# Patient Record
Sex: MALE | Race: WHITE | Employment: FULL TIME | URBAN - METROPOLITAN AREA
[De-identification: names, ages, dates, MRNs, and addresses within clinical notes are randomized per-mention and may not be internally consistent; named-entity substitution may affect disease eponyms.]

---

## 2021-11-08 ENCOUNTER — HOSPITAL ENCOUNTER (OUTPATIENT)
Dept: PHYSICAL THERAPY | Age: 71
Setting detail: THERAPIES SERIES
Discharge: HOME | End: 2021-11-08
Payer: MEDICARE

## 2021-11-08 PROCEDURE — 97110 THERAPEUTIC EXERCISES: CPT

## 2021-11-08 PROCEDURE — 97161 PT EVAL LOW COMPLEX 20 MIN: CPT

## 2021-11-08 PROCEDURE — 97140 MANUAL THERAPY 1/> REGIONS: CPT

## 2021-11-08 PROCEDURE — 97112 NEUROMUSCULAR REEDUCATION: CPT

## 2021-11-08 NOTE — PLAN OF CARE
[place] [id number] [facility] [address], [address] [place], [place], [facility]  Phone: [phone], [phone]                                                    Physical Therapy Certification    Dear Referring Practitioner: Dona Kiser,    We had the pleasure of evaluating the following patient for physical therapy services at [address] [place] [address]. A summary of our findings can be found in the initial assessment below. This includes our plan of care. If you have any questions or concerns regarding these findings, please do not hesitate to contact me at the office phone number checked above. Thank you for the referral.       Physician Signature:_______________________________Date:__________________  By signing above (or electronic signature), therapists plan is approved by physician      Patient: Brittany Malik \"Alvin\"  : 1950   MRN: [id number]  Referring Physician: Referring Practitioner: Dona Kiser      Evaluation Date: 2021      Medical Diagnosis Information:  Diagnosis: Acute Pain of B Shoulders; Neck Pain   Treatment Diagnosis: M25.511; M25.512; M54.2                                         Insurance information: PT Insurance Information: Medicare    Precautions/ Contra-indications/Relevant Medical History: Hx Left Shoulder Pain     C-SSRS Triggered by Intake questionnaire (Past 2 wk assessment):   [x] No, Questionnaire did not trigger screening.   [] Yes, Patient intake triggered further evaluation      [] C-SSRS Screening completed  [] PCP notified via Plan of Care  [] Emergency services notified     Latex Allergy:  [x]NO      []YES     Preferred Language for Healthcare:   [x]English       []other:     SUBJECTIVE: Patient stated complaint: Patient is a 65 y/o male who presents with increased increased B shoulder pain ( Right worse then Left) and neck pain.   Patient reports having had progressive pain over the past few months and decided to have it check out. 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     Living Status/Prior Level of Function: Independent with ADLs and IADLs     OBJECTIVE:     CERV ROM     Cervical Flexion 45 °     Cervical Extension 25 °     Cervical SB R 40 ° / L 35 °     Cervical rotation B 40 °          ROM Left Right   Shoulder Flex 150 °  145 °    Shoulder Abd 135 °  135 °    Shoulder ER T10 L1   Shoulder IR C5 Occiput   Elbow Flex     Elbow Ext     Wrist Flex     Wrist Ext     Strength  Left Right   Shoulder Flex 4+/5 4/5   Shoulder Scap 4+/5 4/5   Shoulder ER 4-/5 4-/5   Shoulder IR 4-/5 4-/5   Elbow Flex     Elbow Ext     Wrist Flex     Wrist Ext     [place]win        Reflexes/Sensation (myotomes/dermatomes):    [x]Normal    []Abnormal:      Joint mobility:    []Normal    [x]Hypo   []Hyper    Palpation: noted tenderness over the [facility] jt and over the long head bicep origin     Functional Mobility/Transfers: WNL    Posture: forward rounded shoulders     Bandages/Dressings/Incisions: n/a    Gait (include devices/WB status): decreased arm swing B however R decreased > L    Orthopedic Special Tests:  Empty Can(+), Dejesus (+), Cervical Compression (+), Cervical Distraction (+), Spurling (-)      [x] Patient history, allergies, meds reviewed. Medical chart reviewed. See intake form.      Review Of Systems (ROS):  [x]Performed Review of systems (Integumentary, CardioPulmonary, Neurological) by intake and observation. Intake form has been scanned into medical record. Patient has been instructed to contact their primary care physician regarding ROS issues if not already being addressed at this time. Co-morbidities/Complexities (which will affect course of rehabilitation):   []None           Arthritic conditions   []Rheumatoid arthritis (M05.9)  []Osteoarthritis (M19.91)   Cardiovascular conditions   []Hypertension (I10)  []Hyperlipidemia (E78.5)  []Angina pectoris (I20)  []Atherosclerosis (I70)   Musculoskeletal conditions   []Disc pathology   []Congenital spine pathologies   []Prior surgical intervention  []Osteoporosis (M81.8)  []Osteopenia (M85.8)   Endocrine conditions   []Hypothyroid (E03.9)  []Hyperthyroid Gastrointestinal conditions   []Constipation (N12.14)   Metabolic conditions   []Morbid obesity (E66.01)  []Diabetes type 1(E10.65) or 2 (E11.65)   []Neuropathy (G60.9)     Pulmonary conditions   []Asthma (J45)  []Coughing   []COPD (J44.9)   Psychological Disorders  []Anxiety (F41.9)  []Depression (F32.9)   []Other:   [x]Other:  Hx Left Shoulder Sx; Numbness and tingling into hands at night       Barriers to/and or personal factors that will affect rehab potential:              []Age  []Sex              []Motivation/Lack of Motivation                        []Co-Morbidities              []Cognitive Function, education/learning barriers              []Environmental, home barriers              []profession/work barriers  []past PT/medical experience  []other:  Justification:     Falls Risk Assessment (30 days):   [x] Falls Risk assessed and no intervention required.   [] Falls Risk assessed and Patient requires intervention due to being higher risk   TUG score (>12s at risk):     [] Falls education provided, including     ASSESSMENT:   Functional Impairments   []Noted spinal or UE joint hypomobility   []Noted spinal or UE joint hypermobility   [x]Decreased UE functional ROM   [x]Decreased UE functional strength   [x]Abnormal reflexes/sensation/myotomal/dermatomal deficits   [x]Decreased RC/scapular/core strength and neuromuscular control   []other:      Functional Activity Limitations (from functional questionnaire and intake)   []Reduced ability to tolerate prolonged functional positions   []Reduced ability or difficulty with changes of positions or transfers between positions   [x]Reduced ability to maintain good posture and demonstrate good body mechanics with sitting, bending, and lifting   [] Reduced ability or tolerance with driving and/or computer work   [x]Reduced ability to sleep   [x]Reduced ability to perform lifting, reaching, carrying tasks   [x]Reduced ability to tolerate impact through UE   [x]Reduced ability to reach behind back   [x]Reduced ability to  or hold objects   [x]Reduced ability to throw or toss an object   []other:    Participation Restrictions   []Reduced participation in self care activities   [x]Reduced participation in home management activities   [x]Reduced participation in work activities   [x]Reduced participation in social activities. []Reduced participation in sport/recreation activities. Classification:   []Signs/symptoms consistent with post-surgical status including decreased ROM, strength and function. []Signs/symptoms consistent with joint sprain/strain   []Signs/symptoms consistent with shoulder impingement   [x]Signs/symptoms consistent with shoulder/elbow/wrist tendinopathy   []Signs/symptoms consistent with Rotator cuff tear   []Signs/symptoms consistent with labral tear   []Signs/symptoms consistent with postural dysfunction    []Signs/symptoms consistent with Glenohumeral IR Deficit - <45 degrees   []Signs/symptoms consistent with facet dysfunction of cervical/thoracic spine    []Signs/symptoms consistent with pathology which may benefit from Dry needling     []other:      Tolerance of evaluation/treatment:    []Excellent   [x]Good    []Fair   []Poor    Physical Therapy Evaluation Complexity Justification   [x] A history of present problem with:  [] no personal factors and/or comorbidities that impact the plan of care;  [x]1-2 personal factors and/or comorbidities that impact the plan of care  []3 personal factors and/or comorbidities that impact the plan of care  [x] An examination of body systems using standardized tests and measures addressing any of the following: body structures and functions (impairments), activity limitations, and/or participation restrictions;:  [] a total of 1-2 or more elements   [] a total of 3 or more elements   [x] a total of 4 or more elements   [x] A clinical presentation with:  [x] stable and/or uncomplicated characteristics   [] evolving clinical presentation with changing characteristics  [] unstable and unpredictable characteristics;   [x] Clinical decision making of [x] low, [] moderate, [] high complexity using standardized patient assessment instrument and/or measurable assessment of functional outcome. [x] EVAL (LOW) 18151 (typically 20 minutes face-to-face)  [] EVAL (MOD) 22979 (typically 30 minutes face-to-face)  [] EVAL (HIGH) 76908 (typically 45 minutes face-to-face)  [] RE-EVAL     PLAN:  Frequency/Duration:  1-2 days per week for 12 weeks:  INTERVENTIONS:  [x]  Therapeutic exercise including: strength training, ROM, for upper extremity and core   [x]  NMR activation and proprioception for UE and Core   [x]  Manual therapy as indicated for UE and spine to include: Dry Needling/IASTM, STM, PROM, Gr I-IV mobilizations, manipulation. [x] Modalities as needed that may include: thermal agents, E-stim, Biofeedback, US, iontophoresis as indicated  [x] Patient education on joint protection, postural re-education, activity modification, progression of HEP.     HEP instruction: Refer to [address] [place] [address] access code and exercises on the 1st visit treatment note    GOALS:    Short Term Goals: To be achieved in: 2 weeks  1. Independent in HEP and progression per patient tolerance, in order to prevent re-injury. [] Progressing: [] Met: [] Not Met: [] Adjusted   2. Patient will have a decrease in pain to facilitate improvement in movement, function, and ADLs as indicated by Functional Deficits. [] Progressing: [] Met: [] Not Met: [] Adjusted     Long Term Goals: To be achieved in: 12 weeks  1. Disability index score of 20% or less for the Quick Dash and Oswestry to assist with reaching prior level of function. [] Progressing: [] Met: [] Not Met: [] Adjusted   2. Patient will demonstrate increased AROM to equal the opposite side bilaterally to allow for proper joint functioning as indicated by patients Functional Deficits. [] Progressing: [] Met: [] Not Met: [] Adjusted   3. Patient will demonstrate an increase in strength of B UE to 4+/5 grossly to allow for proper functional mobility as indicated by patients Functional Deficits. [] Progressing: [] Met: [] Not Met: [] Adjusted   4. Patient will return to all transfers, work activities, and functional activities without increased symptoms or restriction. [] Progressing: [] Met: [] Not Met: [] Adjusted   5. Patient will have 0/10 pain with ADL's.  [] Progressing: [] Met: [] Not Met: [] Adjusted   6.  Patient stated goal: To be able to use R UE away from body and over head without increased pain   [] Progressing: [] Met: [] Not Met: [] Adjusted      Electronically signed by:  Gisel Damon, PT, MPT,ATC

## 2021-11-08 NOTE — FLOWSHEET NOTE
[facility],  [place] [address] [address], [address]  Phone: [phone], [phone]    Physical Therapy Treatment Note/ Progress Report:     Date:  2021    Patient Name:  Eileen Cruz    :  1950  MRN: [id number]  Restrictions/Precautions:    Medical/Treatment Diagnosis Information:  · Diagnosis: Acute Pain of B Shoulders;  Neck Pain  · Treatment Diagnosis: M25.511; M25.512; I27.9  Insurance/Certification information:  PT Insurance Information: Medicare  Physician Information:  Referring Practitioner: Ada Epps  Has the plan of care been signed (Y/N):        []  Yes  [x]  No     Date of Patient follow up with Physician:     Is this a Progress Report:     []  Yes  [x]  No      If Yes:  Date Range for reporting period:  Initial Eval: 2021  Beginnin2021 --- Endin2021    Progress report will be due (10 Rx or 30 days whichever is less):      Recertification will be due (POC Duration  / 90 days whichever is less): 2022      Visit # Insurance Allowable Auth Required   In Person 1 Med Nec []  Yes     []  No    Tele Health -  []  Yes     []  No    Total 1         Functional Scale: Functional Disability Index: Quick Dash: 23% (Total Number Sum: )     Date assessed: 2021                                            Functional Disability Index: Modified Oswestry: 22% (Total Number Sum: )  Date assessed: 2021       Latex Allergy:  [x]NO      []YES  Preferred Language for Healthcare:   [x]English       []other:    Pain level:  0-7/10     SUBJECTIVE:  See eval    OBJECTIVE: See eval   Observation:    Test measurements:      RESTRICTIONS/PRECAUTIONS: n/a    Exercises/Interventions:   Therapeutic Ex (12533)  Therapeutic Activity (71652)  NMR re-education (19380) Sets/Reps Notes/CUES   [place]                    TBand Rows/Ext 20 x  Green TBand   TBand IR/ER 20 x ea Green TBand   TBand B ER 15 x  Green Tband         Supine Cane Flexion 10 x 10\"    Supine Scap Squeeze 20 x     Supine Chin Tuck 15 x 5\"                                                                     Manual Intervention (14565)     Cerivcal Distraction 5'    Cervical P/A Mobs 5'         PROM Shoulder  10'              [employer] access code:   Access Code: [email or web address]  URL: [email or web address]  Date: 11/08/2021  Prepared by: Sherry Broderick    Exercises  Standing Shoulder Row with Anchored Resistance - 1 x daily - 7 x weekly - 3 sets - 10 reps  Shoulder External Rotation with Anchored Resistance - 1 x daily - 7 x weekly - 3 sets - 10 reps  Shoulder Internal Rotation with Resistance - 1 x daily - 7 x weekly - 3 sets - 10 reps  Shoulder External Rotation and Scapular Retraction with Resistance - 1 x daily - 7 x weekly - 3 sets - 10 reps  Shoulder Extension with Resistance - Palms Forward - 1 x daily - 7 x weekly - 3 sets - 10 reps  Supine Shoulder Flexion with Dowel - 1 x daily - 7 x weekly - 1 sets - 10 reps - 10\" hold  Supine Scapular Retraction - 1 x daily - 7 x weekly - 2 sets - 10 reps  Supine Chin Tuck - 1 x daily - 7 x weekly - 1 sets - 15 reps - 5\" hold                    Patient Education 10' Pt education with HEP and progression of PT along with compliance with HEP to aide with formal PT for optimal outcomes. Therapeutic Exercise and NMR EXR  [x] (78138) Provided verbal/tactile cueing for activities related to strengthening, flexibility, endurance, ROM  for improvements in scapular, scapulothoracic and UE control with self care, reaching, carrying, lifting, house/yardwork, driving/computer work. [x] (34163) Provided verbal/tactile cueing for activities related to improving balance, coordination, kinesthetic sense, posture, motor skill, proprioception  to assist with  scapular, scapulothoracic and UE control with self care, reaching, carrying, lifting, house/yardwork, driving/computer work.     Therapeutic Activities:    [x] (44440 or 15454) Provided verbal/tactile cueing for activities related to improving balance, coordination, kinesthetic sense, posture, motor skill, proprioception and motor activation to allow for proper function of scapular, scapulothoracic and UE control with self care, carrying, lifting, driving/computer work.      Home Exercise Program:    [x] (27804) Reviewed/Progressed HEP activities related to strengthening, flexibility, endurance, ROM of scapular, scapulothoracic and UE control with self care, reaching, carrying, lifting, house/yardwork, driving/computer work  [x] (56621) Reviewed/Progressed HEP activities related to improving balance, coordination, kinesthetic sense, posture, motor skill, proprioception of scapular, scapulothoracic and UE control with self care, reaching, carrying, lifting, house/yardwork, driving/computer work      Manual Treatments:  PROM / STM / Oscillations-Mobs:  G-I, II, III, IV (PA's, Inf., Post.)  [x] (21936) Provided manual therapy to mobilize soft tissue/joints of cervical/CT, scapular GHJ and UE for the purpose of modulating pain, promoting relaxation,  increasing ROM, reducing/eliminating soft tissue swelling/inflammation/restriction, improving soft tissue extensibility and allowing for proper ROM for normal function with self care, reaching, carrying, lifting, house/yardwork, driving/computer work    Modalities:      Charges:  Timed Code Treatment Minutes: 40'   Total Treatment Minutes:  60'   BWC:  TE TIME:  NMR TIME:  MANUAL TIME:  UNTIMED MINUTES:  Medicare Total:   -  -  -  -  Visit 1  Total $130      [x] EVAL (LOW) 90117 (typically 20 minutes face-to-face)  [] EVAL (MOD) 17843 (typically 30 minutes face-to-face)  [] EVAL (HIGH) 92912 (typically 45 minutes face-to-face)  [] RE-EVAL     [x] ND(25372) x   1  [] IONTO  [x] NMR (54303) x  1    [] VASO  [x] Manual (33022) x  1   [] Other:  [] TA x      [] Mech Traction (79215)  [] ES(attended) (23224)     [] ES (un) (01008):    ASSESSMENT:  See eval    GOALS:   Short Term Goals: To be achieved in: 2 weeks  1. Independent in HEP and progression per patient tolerance, in order to prevent re-injury. [] Progressing: [] Met: [] Not Met: [] Adjusted   2. Patient will have a decrease in pain to facilitate improvement in movement, function, and ADLs as indicated by Functional Deficits. [] Progressing: [] Met: [] Not Met: [] Adjusted     Long Term Goals: To be achieved in: 12 weeks  1. Disability index score of 20% or less for the Quick Dash and Oswestry to assist with reaching prior level of function. [] Progressing: [] Met: [] Not Met: [] Adjusted   2. Patient will demonstrate increased AROM to equal the opposite side bilaterally to allow for proper joint functioning as indicated by patients Functional Deficits. [] Progressing: [] Met: [] Not Met: [] Adjusted   3. Patient will demonstrate an increase in strength of B UE to 4+/5 grossly to allow for proper functional mobility as indicated by patients Functional Deficits. [] Progressing: [] Met: [] Not Met: [] Adjusted   4. Patient will return to all transfers, work activities, and functional activities without increased symptoms or restriction. [] Progressing: [] Met: [] Not Met: [] Adjusted   5. Patient will have 0/10 pain with ADL's.  [] Progressing: [] Met: [] Not Met: [] Adjusted   6. Patient stated goal: To be able to use R UE away from body and over head without increased pain   [] Progressing: [] Met: [] Not Met: [] Adjusted    Overall Progression Towards Functional goals/ Treatment Progress Update:  [] Patient is progressing as expected towards functional goals listed. [] Progression is slowed due to complexities/Impairments listed. [] Progression has been slowed due to co-morbidities.   [x] Plan just implemented, too soon to assess goals progression <30days   [] Goals require adjustment due to lack of progress  [] Patient is not progressing as expected and requires additional follow up with physician  [] Other    Prognosis for POC: [x] Good [] Fair  [] Poor    Patient requires continued skilled intervention: [x] Yes  [] No    Treatment/Activity Tolerance:  [x] Patient able to complete treatment  [] Patient limited by fatigue  [] Patient limited by pain    [] Patient limited by other medical complications  [] Other:     Return to Play: (if applicable)   []  Stage 1: Intro to Strength   []  Stage 2: Return to Run and Strength   []  Stage 3: Return to Jump and Strength   []  Stage 4: Dynamic Strength and Agility   []  Stage 5: Sport Specific Training     []  Ready to Return to Play, Meets All Above Stages   []  Not Ready for Return to Sports   Comments:                         PLAN: See eval  [] Continue per plan of care [] Alter current plan (see comments above)  [x] Plan of care initiated [] Hold pending MD visit [] Discharge    Electronically signed by:  Royce Dove, PT , MPT,ATC  Note: If patient does not return for scheduled/ recommended follow up visits, this note will serve as a discharge from care along with most recent update on progress.

## 2021-11-10 ENCOUNTER — HOSPITAL ENCOUNTER (OUTPATIENT)
Dept: PHYSICAL THERAPY | Age: 71
Setting detail: THERAPIES SERIES
Discharge: HOME | End: 2021-11-10
Payer: MEDICARE

## 2021-11-10 PROCEDURE — 97112 NEUROMUSCULAR REEDUCATION: CPT | Performed by: PHYSICAL THERAPY ASSISTANT

## 2021-11-10 PROCEDURE — 97140 MANUAL THERAPY 1/> REGIONS: CPT | Performed by: PHYSICAL THERAPY ASSISTANT

## 2021-11-10 PROCEDURE — 97110 THERAPEUTIC EXERCISES: CPT | Performed by: PHYSICAL THERAPY ASSISTANT

## 2021-11-10 NOTE — FLOWSHEET NOTE
Formerly [facility], [address] [place] [address] Jaleesa Olivarezus, [facility]  Phone: [phone], [phone]    Physical Therapy Treatment Note/ Progress Report:     Date:  11/10/2021    Patient Name:  Rockie Cabot    :  1950  MRN: [id number]  Restrictions/Precautions:    Medical/Treatment Diagnosis Information:  · Diagnosis: Acute Pain of B Shoulders; Neck Pain  · Treatment Diagnosis: M25.511; M25.512; K36.1  Insurance/Certification information:  PT Insurance Information: Medicare  Physician Information:  Referring Practitioner: Tiera Juan  Has the plan of care been signed (Y/N):        []  Yes  [x]  No     Date of Patient follow up with Physician:     Is this a Progress Report:     []  Yes  [x]  No      If Yes:  Date Range for reporting period:  Initial Eval: 2021  Beginnin2021 --- Endin2021    Progress report will be due (10 Rx or 30 days whichever is less): 4197     Recertification will be due (POC Duration  / 90 days whichever is less): 2022      Visit # Insurance Allowable Auth Required   In Person 2 Med Nec []  Yes     []  No    Tele Health -  []  Yes     []  No    Total 2         Functional Scale: Functional Disability Index: Quick Dash: 23% (Total Number Sum: )      Date assessed: 2021                               Functional Disability Index: Modified Oswestry: 22% (Total Number Sum: )     Date assessed: 2021       Latex Allergy:  [x]NO      []YES  Preferred Language for Healthcare:   [x]English       []other:    Pain level:  0-7/10     SUBJECTIVE: Patient reports that there is no real change at this time - both shoulders ache and keep him awake at night.       OBJECTIVE: See eval   Observation:    Test measurements:      RESTRICTIONS/PRECAUTIONS: n/a    Exercises/Interventions:   Therapeutic Ex (52114)  Therapeutic Activity (54745)  NMR re-education (41116) Sets/Reps Notes/CUES   Pulley 3 min         Wall push up x20    Ball on wall x20 CW/CCW    Tricep extension Blue x30 ea    Bicep curls 3# x10 ea way 3 way             TBand Rows/Ext 20 x ea Blue TBand   TBand IR/ER 20 x ea Green TBand   TBand B ER 20 x Green Tband         Supine Cane Flexion 10 x 10\"    Supine Scap Squeeze 20 x     Supine Chin Tuck 15 x 5\"              Supine punches 2# x20 ea    Supine flexion x20 ea                                                 Manual Intervention (17343)     Cerivcal Distraction 5'    Cervical P/A Mobs 5'         PROM Shoulder  10'              Medbridge access code:   Access Code: [email or web address]  URL: [email or web address]  Date: 11/08/2021  Prepared by: Davon Rodriguez    Exercises  Standing Shoulder Row with Anchored Resistance - 1 x daily - 7 x weekly - 3 sets - 10 reps  Shoulder External Rotation with Anchored Resistance - 1 x daily - 7 x weekly - 3 sets - 10 reps  Shoulder Internal Rotation with Resistance - 1 x daily - 7 x weekly - 3 sets - 10 reps  Shoulder External Rotation and Scapular Retraction with Resistance - 1 x daily - 7 x weekly - 3 sets - 10 reps  Shoulder Extension with Resistance - Palms Forward - 1 x daily - 7 x weekly - 3 sets - 10 reps  Supine Shoulder Flexion with Dowel - 1 x daily - 7 x weekly - 1 sets - 10 reps - 10\" hold  Supine Scapular Retraction - 1 x daily - 7 x weekly - 2 sets - 10 reps  Supine Chin Tuck - 1 x daily - 7 x weekly - 1 sets - 15 reps - 5\" hold                    Patient Education 10' Pt education with HEP and progression of PT along with compliance with HEP to aide with formal PT for optimal outcomes. Therapeutic Exercise and NMR EXR  [x] (24332) Provided verbal/tactile cueing for activities related to strengthening, flexibility, endurance, ROM  for improvements in scapular, scapulothoracic and UE control with self care, reaching, carrying, lifting, house/yardwork, driving/computer work.     [x] (77442) Provided verbal/tactile cueing for activities related to improving balance, coordination, kinesthetic sense, posture, motor skill, proprioception  to assist with  scapular, scapulothoracic and UE control with self care, reaching, carrying, lifting, house/yardwork, driving/computer work. Therapeutic Activities:    [x] (56600 or 73016) Provided verbal/tactile cueing for activities related to improving balance, coordination, kinesthetic sense, posture, motor skill, proprioception and motor activation to allow for proper function of scapular, scapulothoracic and UE control with self care, carrying, lifting, driving/computer work.      Home Exercise Program:    [x] (64673) Reviewed/Progressed HEP activities related to strengthening, flexibility, endurance, ROM of scapular, scapulothoracic and UE control with self care, reaching, carrying, lifting, house/yardwork, driving/computer work  [x] (66078) Reviewed/Progressed HEP activities related to improving balance, coordination, kinesthetic sense, posture, motor skill, proprioception of scapular, scapulothoracic and UE control with self care, reaching, carrying, lifting, house/yardwork, driving/computer work      Manual Treatments:  PROM / STM / Oscillations-Mobs:  G-I, II, III, IV (PA's, Inf., Post.)  [x] (13124) Provided manual therapy to mobilize soft tissue/joints of cervical/CT, scapular GHJ and UE for the purpose of modulating pain, promoting relaxation,  increasing ROM, reducing/eliminating soft tissue swelling/inflammation/restriction, improving soft tissue extensibility and allowing for proper ROM for normal function with self care, reaching, carrying, lifting, house/yardwork, driving/computer work    Modalities:      Charges:  Timed Code Treatment Minutes: 54'   Total Treatment Minutes:  55'   BWC:  TE TIME:  NMR TIME:  MANUAL TIME:  UNTIMED MINUTES:  Medicare Total:   -  -  -  -  Visit 2  Total $260      [] EVAL (LOW) 99194 (typically 20 minutes face-to-face)  [] EVAL (MOD) 97393 (typically 30 minutes face-to-face)  [] EVAL (HIGH) 81195 (typically 45 minutes face-to-face)  [] RE-EVAL     [x] BU(31433) x   2  [] IONTO  [x] NMR (10817) x  1    [] VASO  [x] Manual (13762) x  1   [] Other:  [] TA x      [] Mech Traction (15251)  [] ES(attended) (80026)     [] ES (un) (05631):    ASSESSMENT:  See eval    GOALS:   Short Term Goals: To be achieved in: 2 weeks  1. Independent in HEP and progression per patient tolerance, in order to prevent re-injury. [] Progressing: [] Met: [] Not Met: [] Adjusted   2. Patient will have a decrease in pain to facilitate improvement in movement, function, and ADLs as indicated by Functional Deficits. [] Progressing: [] Met: [] Not Met: [] Adjusted     Long Term Goals: To be achieved in: 12 weeks  1. Disability index score of 20% or less for the Quick Dash and Oswestry to assist with reaching prior level of function. [] Progressing: [] Met: [] Not Met: [] Adjusted   2. Patient will demonstrate increased AROM to equal the opposite side bilaterally to allow for proper joint functioning as indicated by patients Functional Deficits. [] Progressing: [] Met: [] Not Met: [] Adjusted   3. Patient will demonstrate an increase in strength of B UE to 4+/5 grossly to allow for proper functional mobility as indicated by patients Functional Deficits. [] Progressing: [] Met: [] Not Met: [] Adjusted   4. Patient will return to all transfers, work activities, and functional activities without increased symptoms or restriction. [] Progressing: [] Met: [] Not Met: [] Adjusted   5. Patient will have 0/10 pain with ADL's.  [] Progressing: [] Met: [] Not Met: [] Adjusted   6. Patient stated goal: To be able to use R UE away from body and over head without increased pain   [] Progressing: [] Met: [] Not Met: [] Adjusted    Overall Progression Towards Functional goals/ Treatment Progress Update:  [] Patient is progressing as expected towards functional goals listed. [] Progression is slowed due to complexities/Impairments listed.   [] Progression has been slowed due to co-morbidities. [x] Plan just implemented, too soon to assess goals progression <30days   [] Goals require adjustment due to lack of progress  [] Patient is not progressing as expected and requires additional follow up with physician  [] Other    Prognosis for POC: [x] Good [] Fair  [] Poor    Patient requires continued skilled intervention: [x] Yes  [] No    Treatment/Activity Tolerance:  [x] Patient able to complete treatment  [] Patient limited by fatigue  [] Patient limited by pain    [] Patient limited by other medical complications  [] Other:     Return to Play: (if applicable)   []  Stage 1: Intro to Strength   []  Stage 2: Return to Run and Strength   []  Stage 3: Return to Jump and Strength   []  Stage 4: Dynamic Strength and Agility   []  Stage 5: Sport Specific Training     []  Ready to Return to Play, Meets All Above Stages   []  Not Ready for Return to Sports   Comments:                         PLAN: See eval  [x] Continue per plan of care [] Alter current plan (see comments above)  [] Plan of care initiated [] Hold pending MD visit [] Discharge    Electronically signed by:  Vaishnavi Musa PTA  Note: If patient does not return for scheduled/ recommended follow up visits, this note will serve as a discharge from care along with most recent update on progress.

## 2021-11-15 ENCOUNTER — HOSPITAL ENCOUNTER (OUTPATIENT)
Dept: PHYSICAL THERAPY | Age: 71
Setting detail: THERAPIES SERIES
Discharge: HOME | End: 2021-11-15
Payer: MEDICARE

## 2021-11-15 PROCEDURE — 97140 MANUAL THERAPY 1/> REGIONS: CPT

## 2021-11-15 PROCEDURE — 97112 NEUROMUSCULAR REEDUCATION: CPT

## 2021-11-15 PROCEDURE — 97110 THERAPEUTIC EXERCISES: CPT

## 2021-11-15 NOTE — FLOWSHEET NOTE
[facility], [address] [place] [address] Max Olivarez 01, [address]  Phone: [phone], [phone]    Physical Therapy Treatment Note/ Progress Report:     Date:  11/15/2021    Patient Name:  Ehsan Reagan    :  1950  MRN: [id number]  Restrictions/Precautions:    Medical/Treatment Diagnosis Information:  · Diagnosis: Acute Pain of B Shoulders; Neck Pain  · Treatment Diagnosis: M25.511; M25.512; E04.6  Insurance/Certification information:  PT Insurance Information: Medicare  Physician Information:  Referring Practitioner: Carlito Castro  Has the plan of care been signed (Y/N):        []  Yes  [x]  No     Date of Patient follow up with Physician:     Is this a Progress Report:     []  Yes  [x]  No      If Yes:  Date Range for reporting period:  Initial Eval: 2021  Beginnin2021 --- Endin2021    Progress report will be due (10 Rx or 30 days whichever is less): 5230     Recertification will be due (POC Duration  / 90 days whichever is less): 2022      Visit # Insurance Allowable Auth Required   In Person 3 Med Nec []  Yes     []  No    Tele Health -  []  Yes     []  No    Total 3         Functional Scale: Functional Disability Index: Quick Dash: 23% (Total Number Sum: 55)      Date assessed: 2021                               Functional Disability Index: Modified Oswestry: 22% (Total Number Sum: )     Date assessed: 2021       Latex Allergy:  [x]NO      []YES  Preferred Language for Healthcare:   [x]English       []other:    Pain level:  0-7/10     SUBJECTIVE: Patient reports that there is no real change at this time - both shoulders ache and keep him awake at night.       OBJECTIVE: See eval   Observation:    Test measurements:      RESTRICTIONS/PRECAUTIONS: n/a    Exercises/Interventions:   Therapeutic Ex (88380)  Therapeutic Activity (64154)  NMR re-education (51408) Sets/Reps Notes/CUES   Pulley 3 min         Wall push up x20    Ball on wall x20 CW/CCW    Tricep extension Blue x30 ea    Bicep curls 3# x10 ea way 3 way             TBand Rows/Ext 20 x ea Blue TBand   TBand IR/ER 20 x ea Green TBand   TBand B ER 20 x Green Tband         Supine Cane Flexion 10 x 10\"    Supine Scap Squeeze 20 x     Supine Chin Tuck 15 x 5\"              Supine punches 3# x20 ea    Supine flexion x20 ea         SL ER  20 x R,L                                       Manual Intervention (12116)     Cerivcal Distraction 5'    Cervical P/A Mobs 5'         PROM Shoulder  10'              Medbridge access code:   Access Code: [email or web address]  URL: [email or web address]  Date: 11/08/2021  Prepared by: Jane Chavarria    Exercises  Standing Shoulder Row with Anchored Resistance - 1 x daily - 7 x weekly - 3 sets - 10 reps  Shoulder External Rotation with Anchored Resistance - 1 x daily - 7 x weekly - 3 sets - 10 reps  Shoulder Internal Rotation with Resistance - 1 x daily - 7 x weekly - 3 sets - 10 reps  Shoulder External Rotation and Scapular Retraction with Resistance - 1 x daily - 7 x weekly - 3 sets - 10 reps  Shoulder Extension with Resistance - Palms Forward - 1 x daily - 7 x weekly - 3 sets - 10 reps  Supine Shoulder Flexion with Dowel - 1 x daily - 7 x weekly - 1 sets - 10 reps - 10\" hold  Supine Scapular Retraction - 1 x daily - 7 x weekly - 2 sets - 10 reps  Supine Chin Tuck - 1 x daily - 7 x weekly - 1 sets - 15 reps - 5\" hold                    Patient Education 10' Pt education with HEP and progression of PT along with compliance with HEP to aide with formal PT for optimal outcomes. Therapeutic Exercise and NMR EXR  [x] (05235) Provided verbal/tactile cueing for activities related to strengthening, flexibility, endurance, ROM  for improvements in scapular, scapulothoracic and UE control with self care, reaching, carrying, lifting, house/yardwork, driving/computer work.     [x] (92767) Provided verbal/tactile cueing for activities related to improving balance, coordination, kinesthetic sense, posture, motor skill, proprioception  to assist with  scapular, scapulothoracic and UE control with self care, reaching, carrying, lifting, house/yardwork, driving/computer work. Therapeutic Activities:    [x] (39078 or 13623) Provided verbal/tactile cueing for activities related to improving balance, coordination, kinesthetic sense, posture, motor skill, proprioception and motor activation to allow for proper function of scapular, scapulothoracic and UE control with self care, carrying, lifting, driving/computer work.      Home Exercise Program:    [x] (12088) Reviewed/Progressed HEP activities related to strengthening, flexibility, endurance, ROM of scapular, scapulothoracic and UE control with self care, reaching, carrying, lifting, house/yardwork, driving/computer work  [x] (49083) Reviewed/Progressed HEP activities related to improving balance, coordination, kinesthetic sense, posture, motor skill, proprioception of scapular, scapulothoracic and UE control with self care, reaching, carrying, lifting, house/yardwork, driving/computer work      Manual Treatments:  PROM / STM / Oscillations-Mobs:  G-I, II, III, IV (PA's, Inf., Post.)  [x] (81880) Provided manual therapy to mobilize soft tissue/joints of cervical/CT, scapular GHJ and UE for the purpose of modulating pain, promoting relaxation,  increasing ROM, reducing/eliminating soft tissue swelling/inflammation/restriction, improving soft tissue extensibility and allowing for proper ROM for normal function with self care, reaching, carrying, lifting, house/yardwork, driving/computer work    Modalities:      Charges:  Timed Code Treatment Minutes: 54'   Total Treatment Minutes:  55'   BWC:  TE TIME:  NMR TIME:  MANUAL TIME:  UNTIMED MINUTES:  Medicare Total:   -  -  -  -  Visit 3  Total $360      [] EVAL (LOW) 35641 (typically 20 minutes face-to-face)  [] EVAL (MOD) 99490 (typically 30 minutes face-to-face)  [] EVAL (HIGH) [id number] (typically 45 minutes face-to-face)  [] RE-EVAL     [x] MT(99465) x   1  [] IONTO  [x] NMR (84981) x  1    [] VASO  [x] Manual (82242) x  1   [] Other:  [] TA x      [] Mech Traction (16274)  [] ES(attended) (27126)     [] ES (un) (94306):    ASSESSMENT:  See eval    GOALS:   Short Term Goals: To be achieved in: 2 weeks  1. Independent in HEP and progression per patient tolerance, in order to prevent re-injury. [] Progressing: [] Met: [] Not Met: [] Adjusted   2. Patient will have a decrease in pain to facilitate improvement in movement, function, and ADLs as indicated by Functional Deficits. [] Progressing: [] Met: [] Not Met: [] Adjusted     Long Term Goals: To be achieved in: 12 weeks  1. Disability index score of 20% or less for the Quick Dash and Oswestry to assist with reaching prior level of function. [] Progressing: [] Met: [] Not Met: [] Adjusted   2. Patient will demonstrate increased AROM to equal the opposite side bilaterally to allow for proper joint functioning as indicated by patients Functional Deficits. [] Progressing: [] Met: [] Not Met: [] Adjusted   3. Patient will demonstrate an increase in strength of B UE to 4+/5 grossly to allow for proper functional mobility as indicated by patients Functional Deficits. [] Progressing: [] Met: [] Not Met: [] Adjusted   4. Patient will return to all transfers, work activities, and functional activities without increased symptoms or restriction. [] Progressing: [] Met: [] Not Met: [] Adjusted   5. Patient will have 0/10 pain with ADL's.  [] Progressing: [] Met: [] Not Met: [] Adjusted   6. Patient stated goal: To be able to use R UE away from body and over head without increased pain   [] Progressing: [] Met: [] Not Met: [] Adjusted    Overall Progression Towards Functional goals/ Treatment Progress Update:  [] Patient is progressing as expected towards functional goals listed. [] Progression is slowed due to complexities/Impairments listed.   [] Progression has been slowed due to co-morbidities. [x] Plan just implemented, too soon to assess goals progression <30days   [] Goals require adjustment due to lack of progress  [] Patient is not progressing as expected and requires additional follow up with physician  [] Other    Prognosis for POC: [x] Good [] Fair  [] Poor    Patient requires continued skilled intervention: [x] Yes  [] No    Treatment/Activity Tolerance:  [x] Patient able to complete treatment  [] Patient limited by fatigue  [] Patient limited by pain    [] Patient limited by other medical complications  [] Other:     Return to Play: (if applicable)   []  Stage 1: Intro to Strength   []  Stage 2: Return to Run and Strength   []  Stage 3: Return to Jump and Strength   []  Stage 4: Dynamic Strength and Agility   []  Stage 5: Sport Specific Training     []  Ready to Return to Play, Meets All Above Stages   []  Not Ready for Return to Sports   Comments:                         PLAN: See eval  [x] Continue per plan of care [] Alter current plan (see comments above)  [] Plan of care initiated [] Hold pending MD visit [] Discharge    Electronically signed by:  Asif Gonsalves PT , PTA  Note: If patient does not return for scheduled/ recommended follow up visits, this note will serve as a discharge from care along with most recent update on progress.

## 2021-11-17 ENCOUNTER — HOSPITAL ENCOUNTER (OUTPATIENT)
Dept: PHYSICAL THERAPY | Age: 71
Setting detail: THERAPIES SERIES
Discharge: HOME | End: 2021-11-17
Payer: MEDICARE

## 2021-11-17 PROCEDURE — 97110 THERAPEUTIC EXERCISES: CPT | Performed by: PHYSICAL THERAPY ASSISTANT

## 2021-11-17 PROCEDURE — 97112 NEUROMUSCULAR REEDUCATION: CPT | Performed by: PHYSICAL THERAPY ASSISTANT

## 2021-11-17 PROCEDURE — 97140 MANUAL THERAPY 1/> REGIONS: CPT | Performed by: PHYSICAL THERAPY ASSISTANT

## 2021-11-17 NOTE — FLOWSHEET NOTE
Formerly [facility], [address] [place] [address] Damien Olivarez, [facility]  Phone: [phone], [phone]    Physical Therapy Treatment Note/ Progress Report:     Date:  2021    Patient Name:  July Hardy    :  1950  MRN: [id number]  Restrictions/Precautions:    Medical/Treatment Diagnosis Information:  · Diagnosis: Acute Pain of B Shoulders;  Neck Pain  · Treatment Diagnosis: M25.511; M25.512; V17.4  Insurance/Certification information:  PT Insurance Information: Medicare  Physician Information:  Referring Practitioner: Marika Lockett  Has the plan of care been signed (Y/N):        []  Yes  [x]  No     Date of Patient follow up with Physician:     Is this a Progress Report:     []  Yes  [x]  No      If Yes:  Date Range for reporting period:  Initial Eval: 2021  Beginnin2021 --- Endin2021    Progress report will be due (10 Rx or 30 days whichever is less): 208     Recertification will be due (POC Duration  / 90 days whichever is less): 2022      Visit # Insurance Allowable Auth Required   In Person [facility] []  Yes     []  No    [facility] -  []  Yes     []  No    Total 4         Functional Scale: Functional Disability Index: Quick Dash: 23% (Total Number Sum: 55)      Date assessed: 2021                               Functional Disability Index: Modified Oswestry: 22% (Total Number Sum: )     Date assessed: 2021       Latex Allergy:  [x]NO      []YES  Preferred Language for Healthcare:   [x]English       []other:    Pain level:  0-7/10     SUBJECTIVE: Patient reports that he is not having as many sharp pains going thru his shoulders      OBJECTIVE: See eval   Observation:    Test measurements:      RESTRICTIONS/PRECAUTIONS: n/a    Exercises/Interventions:   Therapeutic Ex (81181)  Therapeutic Activity (51127)  NMR re-education (65742) Sets/Reps Notes/CUES   Pulley 3 min         Wall push up x20    Wall walk x10 R, L    Ball on wall x20 CW/CCW Tricep extension Blue x30 ea    Bicep curls 3# x10 ea way 3 way   PRE flexion/scaption 1# x20 ea         TBand Rows/Ext 30 x ea Blue TBand   TBand IR/ER 30 x ea Green TBand   TBand B ER 30 x Green Tband         Supine Cane Flexion 10 x 10\"    Supine Scap Squeeze 20 x     Supine Chin Tuck 15 x 5\"              Supine punches 3# x20 ea    Supine flexion 1# x20 ea         SL ER  2# 20 x R ,L    SL abduction 2# x20 R, L         Bent over row/ext x20 ea 2#                       Manual Intervention (99018)     Cerivcal Distraction 5'    Cervical P/A Mobs 5'         PROM Shoulder  10'              [employer] access code:   Access Code: [email or web address]  URL: [email or web address]  Date: 11/08/2021  Prepared by: Chente Sol    Exercises  Standing Shoulder Row with Anchored Resistance - 1 x daily - 7 x weekly - 3 sets - 10 reps  Shoulder External Rotation with Anchored Resistance - 1 x daily - 7 x weekly - 3 sets - 10 reps  Shoulder Internal Rotation with Resistance - 1 x daily - 7 x weekly - 3 sets - 10 reps  Shoulder External Rotation and Scapular Retraction with Resistance - 1 x daily - 7 x weekly - 3 sets - 10 reps  Shoulder Extension with Resistance - Palms Forward - 1 x daily - 7 x weekly - 3 sets - 10 reps  Supine Shoulder Flexion with Dowel - 1 x daily - 7 x weekly - 1 sets - 10 reps - 10\" hold  Supine Scapular Retraction - 1 x daily - 7 x weekly - 2 sets - 10 reps  Supine Chin Tuck - 1 x daily - 7 x weekly - 1 sets - 15 reps - 5\" hold                    Patient Education 10' Pt education with HEP and progression of PT along with compliance with HEP to aide with formal PT for optimal outcomes. Therapeutic Exercise and NMR EXR  [x] (25314) Provided verbal/tactile cueing for activities related to strengthening, flexibility, endurance, ROM  for improvements in scapular, scapulothoracic and UE control with self care, reaching, carrying, lifting, house/yardwork, driving/computer work.     [x] (92426) Provided verbal/tactile cueing for activities related to improving balance, coordination, kinesthetic sense, posture, motor skill, proprioception  to assist with  scapular, scapulothoracic and UE control with self care, reaching, carrying, lifting, house/yardwork, driving/computer work. Therapeutic Activities:    [x] (98646 or 67690) Provided verbal/tactile cueing for activities related to improving balance, coordination, kinesthetic sense, posture, motor skill, proprioception and motor activation to allow for proper function of scapular, scapulothoracic and UE control with self care, carrying, lifting, driving/computer work.      Home Exercise Program:    [x] (65974) Reviewed/Progressed HEP activities related to strengthening, flexibility, endurance, ROM of scapular, scapulothoracic and UE control with self care, reaching, carrying, lifting, house/yardwork, driving/computer work  [x] (72777) Reviewed/Progressed HEP activities related to improving balance, coordination, kinesthetic sense, posture, motor skill, proprioception of scapular, scapulothoracic and UE control with self care, reaching, carrying, lifting, house/yardwork, driving/computer work      Manual Treatments:  PROM / STM / Oscillations-Mobs:  G-I, II, III, IV (PA's, Inf., Post.)  [x] (67131) Provided manual therapy to mobilize soft tissue/joints of cervical/CT, scapular GHJ and UE for the purpose of modulating pain, promoting relaxation,  increasing ROM, reducing/eliminating soft tissue swelling/inflammation/restriction, improving soft tissue extensibility and allowing for proper ROM for normal function with self care, reaching, carrying, lifting, house/yardwork, driving/computer work    Modalities:      Charges:  Timed Code Treatment Minutes: 54'   Total Treatment Minutes:  55'   BWC:  TE TIME:  NMR TIME:  MANUAL TIME:  UNTIMED MINUTES:  Medicare Total:   -  -  -  -  Visit 4  Total $490      [] SANKET (LOW) 14977 (typically 20 minutes face-to-face)  [] SANKET (MOD) 81479 (typically 30 minutes face-to-face)  [] EVAL (HIGH) 27718 (typically 45 minutes face-to-face)  [] RE-EVAL     [x] IN(39139) x   2  [] IONTO  [x] NMR (88440) x  1    [] VASO  [x] Manual (98815) x  1   [] Other:  [] TA x      [] Mech Traction (48663)  [] ES(attended) (10665)     [] ES (un) (47831):    ASSESSMENT:      GOALS:   Short Term Goals: To be achieved in: 2 weeks  1. Independent in HEP and progression per patient tolerance, in order to prevent re-injury. [] Progressing: [] Met: [] Not Met: [] Adjusted   2. Patient will have a decrease in pain to facilitate improvement in movement, function, and ADLs as indicated by Functional Deficits. [] Progressing: [] Met: [] Not Met: [] Adjusted     Long Term Goals: To be achieved in: 12 weeks  1. Disability index score of 20% or less for the Quick Dash and Oswestry to assist with reaching prior level of function. [] Progressing: [] Met: [] Not Met: [] Adjusted   2. Patient will demonstrate increased AROM to equal the opposite side bilaterally to allow for proper joint functioning as indicated by patients Functional Deficits. [] Progressing: [] Met: [] Not Met: [] Adjusted   3. Patient will demonstrate an increase in strength of B UE to 4+/5 grossly to allow for proper functional mobility as indicated by patients Functional Deficits. [] Progressing: [] Met: [] Not Met: [] Adjusted   4. Patient will return to all transfers, work activities, and functional activities without increased symptoms or restriction. [] Progressing: [] Met: [] Not Met: [] Adjusted   5. Patient will have 0/10 pain with ADL's.  [] Progressing: [] Met: [] Not Met: [] Adjusted   6. Patient stated goal: To be able to use R UE away from body and over head without increased pain   [] Progressing: [] Met: [] Not Met: [] Adjusted    Overall Progression Towards Functional goals/ Treatment Progress Update:  [] Patient is progressing as expected towards functional goals listed.     [] Progression is slowed due to complexities/Impairments listed. [] Progression has been slowed due to co-morbidities. [x] Plan just implemented, too soon to assess goals progression <30days   [] Goals require adjustment due to lack of progress  [] Patient is not progressing as expected and requires additional follow up with physician  [] Other    Prognosis for POC: [x] Good [] Fair  [] Poor    Patient requires continued skilled intervention: [x] Yes  [] No    Treatment/Activity Tolerance:  [x] Patient able to complete treatment  [] Patient limited by fatigue  [] Patient limited by pain    [] Patient limited by other medical complications  [] Other:     Return to Play: (if applicable)   []  Stage 1: Intro to Strength   []  Stage 2: Return to Run and Strength   []  Stage 3: Return to Jump and Strength   []  Stage 4: Dynamic Strength and Agility   []  Stage 5: Sport Specific Training     []  Ready to Return to Play, Meets All Above Stages   []  Not Ready for Return to Sports   Comments:                         PLAN: See eval  [x] Continue per plan of care [] Alter current plan (see comments above)  [] Plan of care initiated [] Hold pending MD visit [] Discharge    Electronically signed by:  Mariia Aaron PTA  Note: If patient does not return for scheduled/ recommended follow up visits, this note will serve as a discharge from care along with most recent update on progress.

## 2021-11-23 ENCOUNTER — APPOINTMENT (OUTPATIENT)
Dept: PHYSICAL THERAPY | Age: 71
End: 2021-11-23
Payer: MEDICARE

## 2024-01-29 ENCOUNTER — HOSPITAL ENCOUNTER (OUTPATIENT)
Age: 74
Discharge: HOME | End: 2024-01-29
Payer: MEDICARE

## 2024-01-29 ENCOUNTER — HOSPITAL ENCOUNTER (OUTPATIENT)
Dept: GENERAL RADIOLOGY | Age: 74
Discharge: HOME | End: 2024-01-29
Payer: MEDICARE

## 2024-01-29 DIAGNOSIS — R05.8 OTHER COUGH: ICD-10-CM

## 2024-01-29 PROCEDURE — 71046 X-RAY EXAM CHEST 2 VIEWS: CPT

## 2025-08-14 ENCOUNTER — TELEPHONE (OUTPATIENT)
Dept: CARDIOLOGY CLINIC | Age: 75
End: 2025-08-14